# Patient Record
Sex: FEMALE | Race: WHITE | Employment: UNEMPLOYED | URBAN - METROPOLITAN AREA
[De-identification: names, ages, dates, MRNs, and addresses within clinical notes are randomized per-mention and may not be internally consistent; named-entity substitution may affect disease eponyms.]

---

## 2020-07-31 ENCOUNTER — THERAPY VISIT (OUTPATIENT)
Dept: PHYSICAL THERAPY | Facility: CLINIC | Age: 65
End: 2020-07-31
Payer: COMMERCIAL

## 2020-07-31 DIAGNOSIS — S34.21XA: ICD-10-CM

## 2020-07-31 PROCEDURE — 97110 THERAPEUTIC EXERCISES: CPT | Mod: GP | Performed by: PHYSICAL THERAPIST

## 2020-07-31 PROCEDURE — 97161 PT EVAL LOW COMPLEX 20 MIN: CPT | Mod: GP | Performed by: PHYSICAL THERAPIST

## 2020-07-31 NOTE — PROGRESS NOTES
[facility] Initial Evaluation  Subjective:  The history is provided by the patient.   Patient Health History  Alysia Walsh being seen for L thigh pain.     Problem began: 7/23/2020 (date of orders).   Problem occurred: Started in March, woke up with pain; felt like a throbbing, like a vice around her thigh.   Pain is reported as 2/10 on pain scale.  General health as reported by patient is fair.  Pertinent medical history includes: high blood pressure, overweight, smoking, depression and numbness/tingling.   Red flags:  Cold/hot extremity.         Current medications:  Anti-depressants, cardiac medication, high blood pressure medication, pain medication and sleep medication.    Current occupation is Retired.   Primary job tasks include:  Repetitive tasks.                  Therapist Generated HPI Evaluation         Affected Side: L thigh.    This is a new condition.  Condition occurred with:  Insidious onset.  Where condition occurred: other.  Patient reports pain:  Other.  Pain is described as aching and cramping and is intermittent.  Pain radiates to:  Thigh left. Pain is worse during the day.  Since onset symptoms are gradually improving.  Symptoms are exacerbated by standing (taking the stairs)  and relieved by rest (icy hot).                              Objective:  Standing Alignment:        Lumbar:  Lordosis incr                           Lumbar/SI Evaluation  ROM:    AROM Lumbar:   Flexion:          Wnl; flex to toes  Ext:                    Major loss   Side Bend:        Left:  Wnl    Right:  Wnl  Rotation:           Left:     Right:   Side Glide:        Left:     Right:         Strength: L glute med 3/5; L glute max 4/5; R glute med 3+/5; R glute max 4+/5. Good glute max proprio B  Lumbar Myotomes:    T12-L3 (Hip Flex):  Left: 4-    Right: 5-  L2-4 (Quads):  Left:  4    Right:  5-                Lumbar Palpation:    Tenderness present at Left:    Hip Flexors                                                          Kaley Lumbar Evaluation    Posture:  Sitting: fair  Standing: fair  Lordosis: Accentuated  Lateral Shift: right  Correction of Posture: no effect  Other Observations: following EIL shift present  Movement Loss:  Flexion (Flex): nil  Extension (EXT): major      Test Movements:  FIS: During: produces  After: no effect  Mechanical Response: IncROM  Repeat FIS: During: produces  After: no effect      TAMMI: During: produces  After: no effect    Repeat TAMMI: During: produces  After: centralizing  Mechanical Response: IncROM  EIL: During: produces  After: no effect  Mechanical Response: DecrROM  Repeat EIL: After: no effect  Mechanical Response: DecrROM        Conclusion: derangement  Principle of Treatment:    Flexion: Supine DKTC; Seated flexion                                             ROS    Assessment/Plan:    Patient is a 64 year old female with lumbar complaints.    Patient has the following significant findings with corresponding treatment plan.                Diagnosis 1:  L3 N root weakness  Pain -  hot/cold therapy, manual therapy, self management, education, directional preference exercise and home program  Decreased ROM/flexibility - manual therapy, therapeutic exercise and home program  Decreased strength - therapeutic exercise, therapeutic activities and home program  Impaired balance - neuro re-education, therapeutic activities and home program    Therapy Evaluation Codes:   Cumulative Therapy Evaluation is: Low complexity.    Previous and current functional limitations:  (See Goal Flow Sheet for this information)    Short term and Long term goals: (See Goal Flow Sheet for this information)     Communication ability:  Patient appears to be able to clearly communicate and understand verbal and written communication and follow directions correctly.  Treatment Explanation - The following has been discussed with the patient:   RX ordered/plan of care  Anticipated  outcomes  Possible risks and side effects  This patient would benefit from PT intervention to resume normal activities.   Rehab potential is excellent.    Frequency:  1 X week, once daily  Duration:  for 4 weeks tapering to 2 X a month over 6 weeks  Discharge Plan:  Achieve all LTG.  Independent in home treatment program.  Reach maximal therapeutic benefit.    Please refer to the daily flowsheet for treatment today, total treatment time and time spent performing 1:1 timed codes.

## 2020-07-31 NOTE — LETTER
[facility] PHYSICAL THERAPY  [address]  [place] [facility] [address]  [phone]    August 3, 2020    Re: Alysia Walsh   :   1955  MRN:  [id number]   REFERRING PHYSICIAN:   Nika Santos    [facility] PHYSICAL [facility]    Date of Initial Evaluation:  20  Visits:  Rxs Used: 1  Reason for Referral:  Lumbar nerve root injury    EVALUATION SUMMARY    [facility] Initial Evaluation  Subjective:  The history is provided by the patient.     Patient Health History  Alysia Walsh being seen for L thigh pain.   Problem began: 2020 (date of orders).   Problem occurred: Started in March, woke up with pain; felt like a throbbing, like a vice around her thigh.   Pain is reported as 2/10 on pain scale.  General health as reported by patient is fair.  Pertinent medical history includes: high blood pressure, overweight, smoking, depression and numbness/tingling.   Red flags:  Cold/hot extremity.  Current medications:  Anti-depressants, cardiac medication, high blood pressure medication, pain medication and sleep medication.    Current occupation is Retired.   Primary job tasks include:  Repetitive tasks.         Therapist Generated HPI Evaluation         Affected Side: L thigh.  This is a new condition.  Condition occurred with:  Insidious onset.  Where condition occurred: other.  Patient reports pain:  Other.  Pain is described as aching and cramping and is intermittent.  Pain radiates to:  Thigh left. Pain is worse during the day.  Since onset symptoms are gradually improving.  Symptoms are exacerbated by standing (taking the stairs)  and relieved by rest (icy hot).                            Re: Alysia Walsh   :   1955              Objective:  Standing Alignment:    Lumbar:  Lordosis incr  Lumbar/SI Evaluation  ROM:    AROM Lumbar:   Flexion:          Wnl; flex to toes  Ext:                    Major loss    Side Bend:        Left:  Wnl    Right:  Wnl  Rotation:           Left:     Right:   Side Glide:        Left:     Right:   Strength: L glute med 3/5; L glute max 4/5; R glute med 3+/5; R glute max 4+/5. Good glute max proprio B  Lumbar Myotomes:    T12-L3 (Hip Flex):  Left: 4-    Right: 5-  L2-4 (Quads):  Left:  4    Right:  5-  Lumbar Palpation:    Tenderness present at Left:    Hip Flexors  Kaley Lumbar Evaluation  Posture:  Sitting: fair  Standing: fair  Lordosis: Accentuated  Lateral Shift: right  Correction of Posture: no effect  Other Observations: following EIL shift present  Movement Loss:  Flexion (Flex): nil  Extension (EXT): major  Test Movements:  FIS: During: produces  After: no effect  Mechanical Response: IncROM  Repeat FIS: During: produces  After: no effect    TAMMI: During: produces  After: no effect    Repeat TAMMI: During: produces  After: centralizing  Mechanical Response: IncROM  EIL: During: produces  After: no effect  Mechanical Response: DecrROM  Repeat EIL: After: no effect  Mechanical Response: DecrROM  Conclusion: derangement  Principle of Treatment:  Flexion: Supine DKTC; Seated flexion                Re: Alysia Walsh   :   1955          Assessment/Plan:    Patient is a 64 year old female with lumbar complaints.    Patient has the following significant findings with corresponding treatment plan.                Diagnosis 1:  L3 N root weakness  Pain -  hot/cold therapy, manual therapy, self management, education, directional preference exercise and home program  Decreased ROM/flexibility - manual therapy, therapeutic exercise and home program  Decreased strength - therapeutic exercise, therapeutic activities and home program  Impaired balance - neuro re-education, therapeutic activities and home program  Therapy Evaluation Codes:   Cumulative Therapy Evaluation is: Low complexity.  Previous and current functional limitations:  (See Goal Flow Sheet for this information)    Short  term and Long term goals: (See Goal Flow Sheet for this information)   Communication ability:  Patient appears to be able to clearly communicate and understand verbal and written communication and follow directions correctly.  Treatment Explanation - The following has been discussed with the patient:   RX ordered/plan of care  Anticipated outcomes  Possible risks and side effects  This patient would benefit from PT intervention to resume normal activities.   Rehab potential is excellent.  Frequency:  1 X week, once daily  Duration:  for 4 weeks tapering to 2 X a month over 6 weeks  Discharge Plan:  Achieve all LTG.  Independent in home treatment program.  Reach maximal therapeutic benefit.    Thank you for your referral.    INQUIRIES  Therapist: Mignon Lauren, PT   [facility]  [address] [place] [address] [address] [address]  Phone: [phone]  Fax: [phone]

## 2020-08-07 ENCOUNTER — THERAPY VISIT (OUTPATIENT)
Dept: PHYSICAL THERAPY | Facility: CLINIC | Age: 65
End: 2020-08-07
Payer: COMMERCIAL

## 2020-08-07 DIAGNOSIS — S34.21XA: ICD-10-CM

## 2020-08-07 PROCEDURE — 97110 THERAPEUTIC EXERCISES: CPT | Mod: GP | Performed by: PHYSICAL THERAPIST

## 2020-08-13 ENCOUNTER — THERAPY VISIT (OUTPATIENT)
Dept: PHYSICAL THERAPY | Facility: CLINIC | Age: 65
End: 2020-08-13
Payer: COMMERCIAL

## 2020-08-13 DIAGNOSIS — S34.21XA: ICD-10-CM

## 2020-08-13 PROCEDURE — 97110 THERAPEUTIC EXERCISES: CPT | Mod: GP | Performed by: PHYSICAL THERAPIST

## 2020-08-13 PROCEDURE — 97112 NEUROMUSCULAR REEDUCATION: CPT | Mod: GP | Performed by: PHYSICAL THERAPIST

## 2020-08-21 ENCOUNTER — THERAPY VISIT (OUTPATIENT)
Dept: PHYSICAL THERAPY | Facility: CLINIC | Age: 65
End: 2020-08-21
Payer: COMMERCIAL

## 2020-08-21 DIAGNOSIS — S34.21XA: ICD-10-CM

## 2020-08-21 PROCEDURE — 97110 THERAPEUTIC EXERCISES: CPT | Mod: GP | Performed by: PHYSICAL THERAPIST

## 2020-09-04 ENCOUNTER — THERAPY VISIT (OUTPATIENT)
Dept: PHYSICAL THERAPY | Facility: CLINIC | Age: 65
End: 2020-09-04
Payer: COMMERCIAL

## 2020-09-04 DIAGNOSIS — S34.21XA: ICD-10-CM

## 2020-09-04 PROCEDURE — 97110 THERAPEUTIC EXERCISES: CPT | Mod: GP | Performed by: PHYSICAL THERAPIST

## 2020-09-25 ENCOUNTER — THERAPY VISIT (OUTPATIENT)
Dept: PHYSICAL THERAPY | Facility: CLINIC | Age: 65
End: 2020-09-25
Payer: COMMERCIAL

## 2020-09-25 DIAGNOSIS — S34.21XA: ICD-10-CM

## 2020-09-25 PROCEDURE — 97110 THERAPEUTIC EXERCISES: CPT | Mod: GP | Performed by: PHYSICAL THERAPIST

## 2020-09-25 NOTE — PROGRESS NOTES
Subjective:  HPI  Physical Exam                    Objective:  System    Physical Exam    General     ROS    Assessment/Plan:    DISCHARGE REPORT    Progress reporting period is from 7/31/2020 to 9/25/2020.     SUBJECTIVE  Subjective: No longer having pain or sx in L LE.           Changes in function: Yes, see goal flow sheet for change in function   Adverse reactions: None   The subjective and objective information are from the last SOAP note on this patient.    OBJECTIVE  Objective: Full lumbar AROM; No myotomal weakness noted. Glute med 5/5, Glute max 5/5      ASSESSMENT/PLAN  Updated problem list and treatment plan:   L3 n root dysfunction    Decreased proprioception - home program  STG/LTGs have been met or progress has been made towards goals:  Yes (See Goal flow sheet completed today.)  Assessment of Progress: The patient's condition is improving.  The patient's condition has potential to improve.  The patient has met all of their long term goals.  Self Management Plans:  Patient has been instructed in a home treatment program.  Patient is independent in a home treatment program.  Patient  has been instructed in self management of symptoms.  Patient is independent in self management of symptoms.  I have re-evaluated this patient and find that the nature, scope, duration and intensity of the therapy is appropriate for the medical condition of the patient.  Alysia continues to require the following intervention to meet STG and LTG's: PT intervention is no longer required to meet STG/LTG.  We will discharge this patient from PT.    Recommendations:  This patient is ready to be discharged from therapy and continue their home treatment program.    Please refer to the daily flowsheet for treatment today, total treatment time and time spent performing 1:1 timed codes.

## 2020-09-25 NOTE — LETTER
[facility] PHYSICAL THERAPY  [address]  [facility] [facility] [address]  [phone]    2020    Re: Alysia Walsh   :   1955  MRN:  [id number]   REFERRING PHYSICIAN:   Nika Santos    [facility] PHYSICAL [facility]    Date of Initial Evaluation:  20  Visits:  Rxs Used: 6  Reason for Referral:  Lumbar nerve root injury        DISCHARGE REPORT  Progress reporting period is from 2020 to 2020.     SUBJECTIVE  Subjective: No longer having pain or sx in L LE.      Changes in function: Yes, see goal flow sheet for change in function   Adverse reactions: None   The subjective and objective information are from the last SOAP note on this patient.    OBJECTIVE  Objective: Full lumbar AROM; No myotomal weakness noted. Glute med 5/5, Glute max 5/      ASSESSMENT/PLAN  Updated problem list and treatment plan:   L3 n root dysfunction    Decreased proprioception - home program  STG/LTGs have been met or progress has been made towards goals:  Yes (See Goal flow sheet completed today.)  Assessment of Progress: The patient's condition is improving.  The patient's condition has potential to improve.  The patient has met all of their long term goals.  Self Management Plans:  Patient has been instructed in a home treatment program.  Patient is independent in a home treatment program.  Patient  has been instructed in self management of symptoms.  Patient is independent in self management of symptoms.  I have re-evaluated this patient and find that the nature, scope, duration and intensity of the therapy is appropriate for the medical condition of the patient.  Alysia continues to require the following intervention to meet STG and LTG's: PT intervention is no longer required to meet STG/LTG.  Re: Alysia Walsh   :   1955    We will discharge this patient from PT.    Recommendations:  This patient is ready to be discharged  from therapy and continue their home treatment program.    Thank you for your referral.    INQUIRIES  Therapist: Mignon Lauren,    [facility] [place] [facility]  [address] [place] [address] [id number]  [facility] [address]  Phone: [phone]  Fax: [phone]